# Patient Record
Sex: FEMALE | Race: WHITE | Employment: FULL TIME | URBAN - NONMETROPOLITAN AREA
[De-identification: names, ages, dates, MRNs, and addresses within clinical notes are randomized per-mention and may not be internally consistent; named-entity substitution may affect disease eponyms.]

---

## 2017-08-10 ENCOUNTER — NURSE TRIAGE (OUTPATIENT)
Dept: ADMINISTRATIVE | Age: 22
End: 2017-08-10

## 2019-03-30 ENCOUNTER — HOSPITAL ENCOUNTER (EMERGENCY)
Age: 24
Discharge: HOME | End: 2019-03-30
Payer: COMMERCIAL

## 2019-03-30 VITALS
BODY MASS INDEX: 29.53 KG/M2 | WEIGHT: 218 LBS | RESPIRATION RATE: 16 BRPM | HEART RATE: 75 BPM | SYSTOLIC BLOOD PRESSURE: 129 MMHG | OXYGEN SATURATION: 98 % | DIASTOLIC BLOOD PRESSURE: 79 MMHG | TEMPERATURE: 97.5 F | HEIGHT: 72 IN

## 2019-03-30 DIAGNOSIS — J00 COMMON COLD: Primary | ICD-10-CM

## 2019-03-30 LAB
FLU A ANTIGEN: NEGATIVE
FLU B ANTIGEN: NEGATIVE
GROUP A STREP CULTURE, REFLEX: NEGATIVE
REFLEX THROAT C + S: NORMAL

## 2019-03-30 PROCEDURE — 87880 STREP A ASSAY W/OPTIC: CPT

## 2019-03-30 PROCEDURE — 99213 OFFICE O/P EST LOW 20 MIN: CPT

## 2019-03-30 PROCEDURE — 87070 CULTURE OTHR SPECIMN AEROBIC: CPT

## 2019-03-30 PROCEDURE — 87804 INFLUENZA ASSAY W/OPTIC: CPT

## 2019-03-30 PROCEDURE — 99202 OFFICE O/P NEW SF 15 MIN: CPT | Performed by: NURSE PRACTITIONER

## 2019-03-30 RX ORDER — BROMPHENIRAMINE MALEATE, PSEUDOEPHEDRINE HYDROCHLORIDE, AND DEXTROMETHORPHAN HYDROBROMIDE 2; 30; 10 MG/5ML; MG/5ML; MG/5ML
5 SYRUP ORAL 4 TIMES DAILY PRN
Qty: 118 ML | Refills: 0 | Status: SHIPPED | OUTPATIENT
Start: 2019-03-30 | End: 2019-06-29

## 2019-03-30 ASSESSMENT — PAIN SCALES - GENERAL: PAINLEVEL_OUTOF10: 4

## 2019-03-30 ASSESSMENT — ENCOUNTER SYMPTOMS
DIARRHEA: 0
CHEST TIGHTNESS: 0
ABDOMINAL PAIN: 0
NAUSEA: 0
VOMITING: 0
SINUS PRESSURE: 0
TROUBLE SWALLOWING: 0
SORE THROAT: 0
COUGH: 1
SHORTNESS OF BREATH: 0
RHINORRHEA: 0

## 2019-03-30 ASSESSMENT — PAIN DESCRIPTION - LOCATION: LOCATION: HEAD

## 2019-03-30 NOTE — ED NOTES
To [facility] with complaints of cough, sore throat, body aches, back pain. Symptoms started Wednesday night/thursday morning.       Flavio Garza RN  03/30/19 8263

## 2019-03-30 NOTE — ED PROVIDER NOTES
[phone]       Chief Complaint   Patient presents with    Cough    Pharyngitis    Back Pain    Generalized Body Aches       Nurses Notes reviewed and I agree except as noted in the HPI. HISTORY OF PRESENT ILLNESS   Sin Fuller is a 26 y.o. female who presents for evaluation of flu like symptoms. REVIEW OF SYSTEMS     Review of Systems   Constitutional: Positive for fatigue. Negative for chills and fever. HENT: Positive for congestion. Negative for ear pain, postnasal drip, rhinorrhea, sinus pressure, sore throat and trouble swallowing. Respiratory: Positive for cough. Negative for chest tightness and shortness of breath. Cardiovascular: Negative for chest pain. Gastrointestinal: Negative for abdominal pain, diarrhea, nausea and vomiting. Musculoskeletal: Positive for myalgias. Skin: Negative for rash. Allergic/Immunologic: Negative for environmental allergies and food allergies. Neurological: Negative for headaches. PAST MEDICAL HISTORY   History reviewed. No pertinent past medical history. SURGICAL HISTORY     Patient  has no past surgical history on file. CURRENT MEDICATIONS       Previous Medications    No medications on file       ALLERGIES     Patient is has No Known Allergies. FAMILY HISTORY     Patient'sfamily history is not on file. SOCIAL HISTORY     Patient  reports that she has been smoking. She has been smoking about 0.50 packs per day. She has never used smokeless tobacco. She reports that she drank alcohol. She reports that she does not use drugs. PHYSICAL EXAM     ED TRIAGE VITALS  BP: 129/79, Temp: 97.5 °F (36.4 °C), Pulse: 75, Resp: 16, SpO2: 98 %  Physical Exam   Constitutional: She is oriented to person, place, and time. Vital signs are normal. She appears well-developed and well-nourished. She is cooperative. No distress.    HENT:   Head: Normocephalic and atraumatic. Right Ear: Hearing, tympanic membrane, external ear and ear canal normal.   Left Ear: Hearing, tympanic membrane, external ear and ear canal normal.   Nose: Mucosal edema present. No rhinorrhea. Right sinus exhibits no maxillary sinus tenderness and no frontal sinus tenderness. Left sinus exhibits no maxillary sinus tenderness and no frontal sinus tenderness. Mouth/Throat: Uvula is midline, oropharynx is clear and moist and mucous membranes are normal. No uvula swelling. No oropharyngeal exudate, posterior oropharyngeal edema, posterior oropharyngeal erythema or tonsillar abscesses. Eyes: Conjunctivae are normal.   Neck: Normal range of motion and full passive range of motion without pain. No neck rigidity. Cardiovascular: Normal rate and normal heart sounds. Pulmonary/Chest: Effort normal and breath sounds normal. No accessory muscle usage. No respiratory distress. Lymphadenopathy:     She has no cervical adenopathy. Neurological: She is alert and oriented to person, place, and time. Skin: Skin is warm and dry. No rash (on exposed surfaces) noted. Psychiatric: She has a normal mood and affect. Her speech is normal.   Nursing note and vitals reviewed. DIAGNOSTIC RESULTS   Labs:   Results for orders placed or performed during the hospital encounter of 03/30/19   Strep A culture, throat   Result Value Ref Range    REFLEX THROAT C + S INDICATED    Rapid influenza A/B antigens   Result Value Ref Range    Flu A Antigen NEGATIVE NEGATIVE    Flu B Antigen NEGATIVE NEGATIVE   STREP A ANTIGEN   Result Value Ref Range    GROUP A STREP CULTURE, REFLEX NEGATIVE        IMAGING:  No orders to display     URGENT CARE COURSE:     Vitals:    03/30/19 1110   BP: 129/79   Pulse: 75   Resp: 16   Temp: 97.5 °F (36.4 °C)   TempSrc: Temporal   SpO2: 98%   Weight: 218 lb (98.9 kg)   Height: 6' 1\" (1.854 m)       Medications - No data to display  PROCEDURES:  None  FINALIMPRESSION      1.  Common cold DISPOSITION/PLAN   DISPOSITION    Discharge  Strep negative culture pending  Influenza A and B negative   Denies exposure to influenza. Referral to transitional care clinic for further evaluation and monitoring. Physical exam is s unremarkable for acute process patient is afebrile. Symptoms are likely viral in nature at this time. Physical assessment findings, diagnostic testing(s) if applicable, and vital signs reviewed with patient/patient representative. Questions answered. Medications as directed, including OTC medications for supportive care. Education provided on medications. Differential diagnosis(s) discussed with patient/patient representative. Home care/self care instructions reviewed with patient/patient representative. Patient is to follow-up with family care provider in 2-3 days if no improvement. Patient is to go to the emergency department if symptoms worsen. Patient/patient representative is aware of care plan, questions answered, verbalizes understanding and is in agreement. Teach back method used for patient/patient representative teaching(s) and printed instructions attached to after visit summary.     PATIENT REFERRED TO:  [facility]  [address] [place] [facility]  [phone]    As needed, If symptoms worsen, [address] [place] [address] Urgent Care  [address] [facility]  [phone]    As needed, If symptoms worsen, GO DIRECTLY TO THE EMERGENCY DEPARTMENT    DISCHARGE MEDICATIONS:  New Prescriptions    BROMPHENIRAMINE-PSEUDOEPHEDRINE-DM 2-30-10 MG/5ML SYRUP    Take 5 mLs by mouth 4 times daily as needed for Congestion or Cough     Current Discharge Medication List          Alvino Hannon, [address], APRN - CNP  03/30/19 7870

## 2019-04-01 LAB — THROAT/NOSE CULTURE: NORMAL

## 2019-06-29 ENCOUNTER — HOSPITAL ENCOUNTER (EMERGENCY)
Age: 24
Discharge: HOME | End: 2019-06-29
Payer: MEDICAID

## 2019-06-29 ENCOUNTER — APPOINTMENT (OUTPATIENT)
Dept: ULTRASOUND IMAGING | Age: 24
End: 2019-06-29
Payer: MEDICAID

## 2019-06-29 VITALS
RESPIRATION RATE: 18 BRPM | DIASTOLIC BLOOD PRESSURE: 62 MMHG | SYSTOLIC BLOOD PRESSURE: 118 MMHG | HEIGHT: 72 IN | TEMPERATURE: 98.9 F | HEART RATE: 62 BPM | WEIGHT: 215 LBS | BODY MASS INDEX: 29.12 KG/M2 | OXYGEN SATURATION: 99 %

## 2019-06-29 DIAGNOSIS — O46.90 VAGINAL BLEEDING IN PREGNANCY: Primary | ICD-10-CM

## 2019-06-29 LAB
ABO: NORMAL
ALBUMIN SERPL-MCNC: 4.1 G/DL (ref 3.5–5.1)
ALP BLD-CCNC: 31 U/L (ref 38–126)
ALT SERPL-CCNC: 11 U/L (ref 11–66)
AMPHETAMINE+METHAMPHETAMINE URINE SCREEN: NEGATIVE
ANION GAP SERPL CALCULATED.3IONS-SCNC: 11 MEQ/L (ref 8–16)
AST SERPL-CCNC: 15 U/L (ref 5–40)
BARBITURATE QUANTITATIVE URINE: NEGATIVE
BASOPHILS # BLD: 0.3 %
BASOPHILS ABSOLUTE: 0 THOU/MM3 (ref 0–0.1)
BENZODIAZEPINE QUANTITATIVE URINE: NEGATIVE
BILIRUB SERPL-MCNC: 0.3 MG/DL (ref 0.3–1.2)
BILIRUBIN DIRECT: < 0.2 MG/DL (ref 0–0.3)
BILIRUBIN URINE: NEGATIVE
BLOOD, URINE: NEGATIVE
BUN BLDV-MCNC: 9 MG/DL (ref 7–22)
CALCIUM SERPL-MCNC: 8.9 MG/DL (ref 8.5–10.5)
CANNABINOID QUANTITATIVE URINE: POSITIVE
CHARACTER, URINE: CLEAR
CHLAMYDIA TRACHOMATIS BY RT-PCR: NOT DETECTED
CHLORIDE BLD-SCNC: 103 MEQ/L (ref 98–111)
CO2: 23 MEQ/L (ref 23–33)
COCAINE METABOLITE QUANTITATIVE URINE: NEGATIVE
COLOR: YELLOW
CREAT SERPL-MCNC: 0.4 MG/DL (ref 0.4–1.2)
CT/NG SOURCE: NORMAL
EOSINOPHIL # BLD: 1.2 %
EOSINOPHILS ABSOLUTE: 0.1 THOU/MM3 (ref 0–0.4)
ERYTHROCYTE [DISTWIDTH] IN BLOOD BY AUTOMATED COUNT: 12.7 % (ref 11.5–14.5)
ERYTHROCYTE [DISTWIDTH] IN BLOOD BY AUTOMATED COUNT: 41.7 FL (ref 35–45)
GFR SERPL CREATININE-BSD FRML MDRD: > 90 ML/MIN/1.73M2
GLUCOSE BLD-MCNC: 83 MG/DL (ref 70–108)
GLUCOSE URINE: NEGATIVE MG/DL
HCG,BETA SUBUNIT,QUAL,SERUM: ABNORMAL MIU/ML (ref 0–5)
HCT VFR BLD CALC: 35.1 % (ref 37–47)
HEMOGLOBIN: 11.9 GM/DL (ref 12–16)
IMMATURE GRANS (ABS): 0.03 THOU/MM3 (ref 0–0.07)
IMMATURE GRANULOCYTES: 0.4 %
KETONES, URINE: 15
KOH PREP: NORMAL
LEUKOCYTE ESTERASE, URINE: NEGATIVE
LIPASE: 31.7 U/L (ref 5.6–51.3)
LYMPHOCYTES # BLD: 23.6 %
LYMPHOCYTES ABSOLUTE: 1.8 THOU/MM3 (ref 1–4.8)
MAGNESIUM: 1.9 MG/DL (ref 1.6–2.4)
MCH RBC QN AUTO: 31 PG (ref 26–33)
MCHC RBC AUTO-ENTMCNC: 33.9 GM/DL (ref 32.2–35.5)
MCV RBC AUTO: 91.4 FL (ref 81–99)
MONOCYTES # BLD: 8.5 %
MONOCYTES ABSOLUTE: 0.7 THOU/MM3 (ref 0.4–1.3)
NEISSERIA GONORRHOEAE BY RT-PCR: NOT DETECTED
NITRITE, URINE: NEGATIVE
NUCLEATED RED BLOOD CELLS: 0 /100 WBC
OPIATES, URINE: NEGATIVE
OSMOLALITY CALCULATION: 271.6 MOSMOL/KG (ref 275–300)
OXYCODONE: NEGATIVE
PH UA: 6.5 (ref 5–9)
PHENCYCLIDINE QUANTITATIVE URINE: NEGATIVE
PLATELET # BLD: 144 THOU/MM3 (ref 130–400)
PMV BLD AUTO: 11.8 FL (ref 9.4–12.4)
POTASSIUM SERPL-SCNC: 3.7 MEQ/L (ref 3.5–5.2)
PREGNANCY, SERUM: POSITIVE
PROTEIN UA: NEGATIVE
RBC # BLD: 3.84 MILL/MM3 (ref 4.2–5.4)
REASON FOR REJECTION: NORMAL
REJECTED TEST: NORMAL
RH FACTOR: NORMAL
SEG NEUTROPHILS: 66 %
SEGMENTED NEUTROPHILS ABSOLUTE COUNT: 5.1 THOU/MM3 (ref 1.8–7.7)
SODIUM BLD-SCNC: 137 MEQ/L (ref 135–145)
SPECIFIC GRAVITY, URINE: 1.01 (ref 1–1.03)
TOTAL PROTEIN: 6.6 G/DL (ref 6.1–8)
TRICHOMONAS PREP: NORMAL
UROBILINOGEN, URINE: 0.2 EU/DL (ref 0–1)
WBC # BLD: 7.8 THOU/MM3 (ref 4.8–10.8)

## 2019-06-29 PROCEDURE — 86900 BLOOD TYPING SEROLOGIC ABO: CPT

## 2019-06-29 PROCEDURE — 36415 COLL VENOUS BLD VENIPUNCTURE: CPT

## 2019-06-29 PROCEDURE — 82248 BILIRUBIN DIRECT: CPT

## 2019-06-29 PROCEDURE — 84703 CHORIONIC GONADOTROPIN ASSAY: CPT

## 2019-06-29 PROCEDURE — 87220 TISSUE EXAM FOR FUNGI: CPT

## 2019-06-29 PROCEDURE — 80307 DRUG TEST PRSMV CHEM ANLYZR: CPT

## 2019-06-29 PROCEDURE — 80053 COMPREHEN METABOLIC PANEL: CPT

## 2019-06-29 PROCEDURE — 86901 BLOOD TYPING SEROLOGIC RH(D): CPT

## 2019-06-29 PROCEDURE — 99284 EMERGENCY DEPT VISIT MOD MDM: CPT

## 2019-06-29 PROCEDURE — 83735 ASSAY OF MAGNESIUM: CPT

## 2019-06-29 PROCEDURE — 83690 ASSAY OF LIPASE: CPT

## 2019-06-29 PROCEDURE — 87210 SMEAR WET MOUNT SALINE/INK: CPT

## 2019-06-29 PROCEDURE — 85025 COMPLETE CBC W/AUTO DIFF WBC: CPT

## 2019-06-29 PROCEDURE — 81003 URINALYSIS AUTO W/O SCOPE: CPT

## 2019-06-29 PROCEDURE — 87591 N.GONORRHOEAE DNA AMP PROB: CPT

## 2019-06-29 PROCEDURE — 87070 CULTURE OTHR SPECIMN AEROBIC: CPT

## 2019-06-29 PROCEDURE — 87205 SMEAR GRAM STAIN: CPT

## 2019-06-29 PROCEDURE — 76817 TRANSVAGINAL US OBSTETRIC: CPT

## 2019-06-29 PROCEDURE — 2580000003 HC RX 258: Performed by: NURSE PRACTITIONER

## 2019-06-29 PROCEDURE — 87491 CHLMYD TRACH DNA AMP PROBE: CPT

## 2019-06-29 PROCEDURE — 84702 CHORIONIC GONADOTROPIN TEST: CPT

## 2019-06-29 RX ORDER — 0.9 % SODIUM CHLORIDE 0.9 %
1000 INTRAVENOUS SOLUTION INTRAVENOUS ONCE
Status: COMPLETED | OUTPATIENT
Start: 2019-06-29 | End: 2019-06-29

## 2019-06-29 RX ORDER — METOCLOPRAMIDE 10 MG/1
10 TABLET ORAL 4 TIMES DAILY
COMMUNITY

## 2019-06-29 RX ADMIN — SODIUM CHLORIDE 1000 ML: 9 INJECTION, SOLUTION INTRAVENOUS at 01:58

## 2019-06-29 ASSESSMENT — PAIN DESCRIPTION - ORIENTATION: ORIENTATION: LOWER

## 2019-06-29 ASSESSMENT — ENCOUNTER SYMPTOMS
SORE THROAT: 0
ABDOMINAL PAIN: 1
RHINORRHEA: 0
WHEEZING: 0
DIARRHEA: 0
VOMITING: 1
NAUSEA: 1
SHORTNESS OF BREATH: 0
BACK PAIN: 0
COUGH: 0
EYE DISCHARGE: 0
EYE PAIN: 0

## 2019-06-29 ASSESSMENT — PAIN SCALES - GENERAL: PAINLEVEL_OUTOF10: 4

## 2019-06-29 ASSESSMENT — PAIN DESCRIPTION - DESCRIPTORS: DESCRIPTORS: CRAMPING

## 2019-06-29 ASSESSMENT — PAIN DESCRIPTION - LOCATION: LOCATION: ABDOMEN

## 2019-06-29 NOTE — ED NOTES
Patient urinated in ultrasound told the tech she needed to give a sample and was told she didn't have a cup. Will wait for patient to try to urinate to get sample provider updated on delay of care.       Prince Jensen RN  06/29/19 9444

## 2019-06-29 NOTE — ED PROVIDER NOTES
work was reassuring. Within the department, the patient was treated with IV fluids. I observed the patient's condition to remain stable during the duration of the stay. I explained my proposed course of treatment to the patient, who were amenable to my treatment and discharge decisions. They were discharged home in stable condition, and the patient will return to the ED if the symptoms become more severe in nature or otherwise change. Patient is advised to follow with an OBGYN. CRITICAL CARE:   None      CONSULTS:  Dr Fabiola Urbina, agrees with my plan to discharge with ob followup    PROCEDURES:  None     FINAL IMPRESSION     1. Vaginal bleeding in pregnancy          DISPOSITION/PLAN   discharge    PATIENT REFERRED TO:  Tierney Mims MD  [place] [address]  [phone]    In 2 days        DISCHARGE MEDICATIONS:  New Prescriptions    No medications on file       (Please note that portions of this note were completed with a voice recognition program.Efforts were made to edit the dictations but occasionally words are mis-transcribed.)    The patient was given an opportunity to see the EmergencyAttending. The patient voiced understanding that I was a Mid-Level Provider and was in agreement with being seenindependently by myself. Scribe: Francine Russ 6/29/19 1:41 AM Scribing for and in the [address] [place], [place] [address]TONIA clark. .    Signed by:Jessica Mckeon, 06/29/19 5:23 AM    Provider:  I personally performed the services described in the documentation, reviewed and edited thedocumentation which was dictated to the scribe in my presence, and it accurately records my words andactions.     Christophe Jo CNP. 6/29/19 5:23 AM        [place] MEHREEN Johnston CNP  07/01/19 0966

## 2019-07-02 LAB
GENITAL CULTURE, ROUTINE: NORMAL
GRAM STAIN RESULT: NORMAL

## 2019-07-08 ENCOUNTER — HOSPITAL ENCOUNTER (OUTPATIENT)
Dept: NURSING | Age: 24
Discharge: HOME | End: 2019-07-08
Payer: COMMERCIAL

## 2019-07-08 VITALS
OXYGEN SATURATION: 100 % | TEMPERATURE: 97.9 F | SYSTOLIC BLOOD PRESSURE: 114 MMHG | RESPIRATION RATE: 18 BRPM | HEART RATE: 66 BPM | DIASTOLIC BLOOD PRESSURE: 65 MMHG

## 2019-07-08 LAB
ABO: NORMAL
ANION GAP SERPL CALCULATED.3IONS-SCNC: 14 MEQ/L (ref 8–16)
ANTIBODY SCREEN: NORMAL
BUN BLDV-MCNC: 7 MG/DL (ref 7–22)
CALCIUM SERPL-MCNC: 9.8 MG/DL (ref 8.5–10.5)
CHLORIDE BLD-SCNC: 100 MEQ/L (ref 98–111)
CO2: 22 MEQ/L (ref 23–33)
CREAT SERPL-MCNC: 0.5 MG/DL (ref 0.4–1.2)
ERYTHROCYTE [DISTWIDTH] IN BLOOD BY AUTOMATED COUNT: 12.4 % (ref 11.5–14.5)
ERYTHROCYTE [DISTWIDTH] IN BLOOD BY AUTOMATED COUNT: 40.6 FL (ref 35–45)
GFR SERPL CREATININE-BSD FRML MDRD: > 90 ML/MIN/1.73M2
GLUCOSE BLD-MCNC: 88 MG/DL (ref 70–108)
HCT VFR BLD CALC: 42.2 % (ref 37–47)
HEMOGLOBIN: 14.7 GM/DL (ref 12–16)
HEPATITIS B SURFACE ANTIGEN: NEGATIVE
MCH RBC QN AUTO: 31.4 PG (ref 26–33)
MCHC RBC AUTO-ENTMCNC: 34.8 GM/DL (ref 32.2–35.5)
MCV RBC AUTO: 90.2 FL (ref 81–99)
PLATELET # BLD: 154 THOU/MM3 (ref 130–400)
PMV BLD AUTO: 11.7 FL (ref 9.4–12.4)
POTASSIUM SERPL-SCNC: 3.6 MEQ/L (ref 3.5–5.2)
RBC # BLD: 4.68 MILL/MM3 (ref 4.2–5.4)
RH FACTOR: NORMAL
RPR: NONREACTIVE
RUBELLA: 293.7 IU/ML
SODIUM BLD-SCNC: 136 MEQ/L (ref 135–145)
WBC # BLD: 8.5 THOU/MM3 (ref 4.8–10.8)

## 2019-07-08 PROCEDURE — 96374 THER/PROPH/DIAG INJ IV PUSH: CPT

## 2019-07-08 PROCEDURE — 6360000002 HC RX W HCPCS: Performed by: OBSTETRICS & GYNECOLOGY

## 2019-07-08 PROCEDURE — 87389 HIV-1 AG W/HIV-1&-2 AB AG IA: CPT

## 2019-07-08 PROCEDURE — 80048 BASIC METABOLIC PNL TOTAL CA: CPT

## 2019-07-08 PROCEDURE — 36415 COLL VENOUS BLD VENIPUNCTURE: CPT

## 2019-07-08 PROCEDURE — 86901 BLOOD TYPING SEROLOGIC RH(D): CPT

## 2019-07-08 PROCEDURE — 99211 OFF/OP EST MAY X REQ PHY/QHP: CPT

## 2019-07-08 PROCEDURE — 96375 TX/PRO/DX INJ NEW DRUG ADDON: CPT

## 2019-07-08 PROCEDURE — 96360 HYDRATION IV INFUSION INIT: CPT

## 2019-07-08 PROCEDURE — 86592 SYPHILIS TEST NON-TREP QUAL: CPT

## 2019-07-08 PROCEDURE — 96361 HYDRATE IV INFUSION ADD-ON: CPT

## 2019-07-08 PROCEDURE — 87340 HEPATITIS B SURFACE AG IA: CPT

## 2019-07-08 PROCEDURE — 2580000003 HC RX 258: Performed by: OBSTETRICS & GYNECOLOGY

## 2019-07-08 PROCEDURE — 2709999900 HC NON-CHARGEABLE SUPPLY

## 2019-07-08 PROCEDURE — 86762 RUBELLA ANTIBODY: CPT

## 2019-07-08 PROCEDURE — 86900 BLOOD TYPING SEROLOGIC ABO: CPT

## 2019-07-08 PROCEDURE — 85027 COMPLETE CBC AUTOMATED: CPT

## 2019-07-08 PROCEDURE — 86850 RBC ANTIBODY SCREEN: CPT

## 2019-07-08 RX ORDER — LANOLIN ALCOHOL/MO/W.PET/CERES
50 CREAM (GRAM) TOPICAL 2 TIMES DAILY
COMMUNITY

## 2019-07-08 RX ORDER — ONDANSETRON 2 MG/ML
8 INJECTION INTRAMUSCULAR; INTRAVENOUS
Status: COMPLETED | OUTPATIENT
Start: 2019-07-08 | End: 2019-07-08

## 2019-07-08 RX ORDER — FOLIC ACID 1 MG/1
1 TABLET ORAL DAILY
COMMUNITY

## 2019-07-08 RX ORDER — DEXTROSE, SODIUM CHLORIDE, SODIUM LACTATE, POTASSIUM CHLORIDE, AND CALCIUM CHLORIDE 5; .6; .31; .03; .02 G/100ML; G/100ML; G/100ML; G/100ML; G/100ML
INJECTION, SOLUTION INTRAVENOUS CONTINUOUS
Status: ACTIVE | OUTPATIENT
Start: 2019-07-08 | End: 2019-07-08

## 2019-07-08 RX ORDER — SODIUM CHLORIDE, SODIUM LACTATE, POTASSIUM CHLORIDE, CALCIUM CHLORIDE 600; 310; 30; 20 MG/100ML; MG/100ML; MG/100ML; MG/100ML
INJECTION, SOLUTION INTRAVENOUS CONTINUOUS
Status: ACTIVE | OUTPATIENT
Start: 2019-07-08 | End: 2019-07-08

## 2019-07-08 RX ORDER — PROMETHAZINE HYDROCHLORIDE 25 MG/ML
12.5 INJECTION, SOLUTION INTRAMUSCULAR; INTRAVENOUS
Status: ACTIVE | OUTPATIENT
Start: 2019-07-08 | End: 2019-07-08

## 2019-07-08 RX ADMIN — ONDANSETRON 8 MG: 2 INJECTION INTRAMUSCULAR; INTRAVENOUS at 11:48

## 2019-07-08 RX ADMIN — SODIUM CHLORIDE, POTASSIUM CHLORIDE, SODIUM LACTATE AND CALCIUM CHLORIDE: 600; 310; 30; 20 INJECTION, SOLUTION INTRAVENOUS at 12:06

## 2019-07-08 RX ADMIN — SODIUM CHLORIDE, SODIUM LACTATE, POTASSIUM CHLORIDE, CALCIUM CHLORIDE AND DEXTROSE MONOHYDRATE: 5; 600; 310; 30; 20 INJECTION, SOLUTION INTRAVENOUS at 11:00

## 2019-07-08 ASSESSMENT — PAIN DESCRIPTION - DESCRIPTORS: DESCRIPTORS: ACHING;SHARP

## 2019-07-08 ASSESSMENT — PAIN - FUNCTIONAL ASSESSMENT: PAIN_FUNCTIONAL_ASSESSMENT: 0-10

## 2019-07-08 NOTE — PROGRESS NOTES
[address] FLUIDS PROCEDURE REVIEWED WITH PT VERBALIZED UNDERSTANDING. Pt rights and responsibilities offered to pt to read.     __MET__ Safety:       (Environmental)   [place] to environment   Ensure ID band is correct and in place/ allergy band as needed   Assess for fall risk   Initiate fall precautions as applicable (fall band, side rails, etc.)   Call light within reach   Bed in low position/ wheels locked    __MET__ Pain:        Assess pain level and characteristics   Administer analgesics as ordered   Assess effectiveness of pain management and report to MD as needed    __MET__ Knowledge Deficit:   Assess baseline knowledge   Provide teaching at level of understanding   Provide teaching via preferred learning method   Evaluate teaching effectiveness

## 2019-07-08 NOTE — PROGRESS NOTES
Dr Lan Sesay office called referring to patients lab work, office staff stated it's okay to discharge after her infusions.

## 2019-07-09 LAB
HIV-2 AB: NEGATIVE
REASON FOR REJECTION: NORMAL
REJECTED TEST: NORMAL

## 2019-07-10 ENCOUNTER — NURSE ONLY (OUTPATIENT)
Dept: LAB | Age: 24
End: 2019-07-10

## 2019-07-10 LAB
ABO: NORMAL
ANTIBODY SCREEN: NORMAL
ERYTHROCYTE [DISTWIDTH] IN BLOOD BY AUTOMATED COUNT: NORMAL % (ref 11.5–14.5)
ERYTHROCYTE [DISTWIDTH] IN BLOOD BY AUTOMATED COUNT: NORMAL FL (ref 35–45)
HCT VFR BLD CALC: NORMAL % (ref 37–47)
HEMOGLOBIN: NORMAL GM/DL (ref 12–16)
HEPATITIS B SURFACE ANTIGEN: NORMAL
MCH RBC QN AUTO: NORMAL PG (ref 26–33)
MCHC RBC AUTO-ENTMCNC: NORMAL GM/DL (ref 32.2–35.5)
MCV RBC AUTO: NORMAL FL (ref 81–99)
PLATELET # BLD: NORMAL THOU/MM3 (ref 130–400)
PMV BLD AUTO: NORMAL FL (ref 9.4–12.4)
RBC # BLD: NORMAL MILL/MM3 (ref 4.2–5.4)
RH FACTOR: NORMAL
RPR: NORMAL
RUBELLA: NORMAL IU/ML
WBC # BLD: NORMAL THOU/MM3 (ref 4.8–10.8)

## 2019-07-11 LAB
ORGANISM: ABNORMAL
URINE CULTURE, ROUTINE: ABNORMAL

## 2020-01-20 ENCOUNTER — HOSPITAL ENCOUNTER (INPATIENT)
Age: 25
LOS: 2 days | Discharge: HOME | DRG: 560 | End: 2020-01-22
Attending: OBSTETRICS & GYNECOLOGY | Admitting: OBSTETRICS & GYNECOLOGY
Payer: MEDICAID

## 2020-01-20 ENCOUNTER — ANESTHESIA EVENT (OUTPATIENT)
Dept: LABOR AND DELIVERY | Age: 25
DRG: 560 | End: 2020-01-20
Payer: MEDICAID

## 2020-01-20 ENCOUNTER — ANESTHESIA (OUTPATIENT)
Dept: LABOR AND DELIVERY | Age: 25
DRG: 560 | End: 2020-01-20
Payer: MEDICAID

## 2020-01-20 ENCOUNTER — APPOINTMENT (OUTPATIENT)
Dept: LABOR AND DELIVERY | Age: 25
DRG: 560 | End: 2020-01-20
Payer: MEDICAID

## 2020-01-20 LAB
ABO: NORMAL
ALBUMIN SERPL-MCNC: 3 G/DL (ref 3.5–5.1)
ALP BLD-CCNC: 269 U/L (ref 38–126)
ALT SERPL-CCNC: 8 U/L (ref 11–66)
AMNISURE PATIENT RESULT: NORMAL
AMPHETAMINE+METHAMPHETAMINE URINE SCREEN: NEGATIVE
ANION GAP SERPL CALCULATED.3IONS-SCNC: 12 MEQ/L (ref 8–16)
ANTIBODY SCREEN: NORMAL
AST SERPL-CCNC: 14 U/L (ref 5–40)
BARBITURATE QUANTITATIVE URINE: NEGATIVE
BENZODIAZEPINE QUANTITATIVE URINE: NEGATIVE
BILIRUB SERPL-MCNC: 0.3 MG/DL (ref 0.3–1.2)
BUN BLDV-MCNC: 10 MG/DL (ref 7–22)
CALCIUM SERPL-MCNC: 9.1 MG/DL (ref 8.5–10.5)
CANNABINOID QUANTITATIVE URINE: NEGATIVE
CHLORIDE BLD-SCNC: 106 MEQ/L (ref 98–111)
CO2: 19 MEQ/L (ref 23–33)
COCAINE METABOLITE QUANTITATIVE URINE: NEGATIVE
CREAT SERPL-MCNC: 0.5 MG/DL (ref 0.4–1.2)
CREATININE URINE: 82.4 MG/DL
ERYTHROCYTE [DISTWIDTH] IN BLOOD BY AUTOMATED COUNT: 12.7 % (ref 11.5–14.5)
ERYTHROCYTE [DISTWIDTH] IN BLOOD BY AUTOMATED COUNT: 42 FL (ref 35–45)
GFR SERPL CREATININE-BSD FRML MDRD: > 90 ML/MIN/1.73M2
GLUCOSE BLD-MCNC: 80 MG/DL (ref 70–108)
HCT VFR BLD CALC: 38.6 % (ref 37–47)
HEMOGLOBIN: 13.1 GM/DL (ref 12–16)
MCH RBC QN AUTO: 31 PG (ref 26–33)
MCHC RBC AUTO-ENTMCNC: 33.9 GM/DL (ref 32.2–35.5)
MCV RBC AUTO: 91.5 FL (ref 81–99)
OPIATES, URINE: NEGATIVE
OXYCODONE: NEGATIVE
PHENCYCLIDINE QUANTITATIVE URINE: NEGATIVE
PLATELET # BLD: 172 THOU/MM3 (ref 130–400)
PMV BLD AUTO: 12.2 FL (ref 9.4–12.4)
POTASSIUM SERPL-SCNC: 3.9 MEQ/L (ref 3.5–5.2)
PROT/CREAT RATIO, UR: 0.16
PROTEIN, URINE: 13 MG/DL
RBC # BLD: 4.22 MILL/MM3 (ref 4.2–5.4)
RH FACTOR: NORMAL
RPR: NONREACTIVE
SODIUM BLD-SCNC: 137 MEQ/L (ref 135–145)
TOTAL PROTEIN: 5.9 G/DL (ref 6.1–8)
URIC ACID: 4.7 MG/DL (ref 2.4–5.7)
WBC # BLD: 13.2 THOU/MM3 (ref 4.8–10.8)

## 2020-01-20 PROCEDURE — 86592 SYPHILIS TEST NON-TREP QUAL: CPT

## 2020-01-20 PROCEDURE — 86901 BLOOD TYPING SEROLOGIC RH(D): CPT

## 2020-01-20 PROCEDURE — 80053 COMPREHEN METABOLIC PANEL: CPT

## 2020-01-20 PROCEDURE — 82570 ASSAY OF URINE CREATININE: CPT

## 2020-01-20 PROCEDURE — 85027 COMPLETE CBC AUTOMATED: CPT

## 2020-01-20 PROCEDURE — 84156 ASSAY OF PROTEIN URINE: CPT

## 2020-01-20 PROCEDURE — 3700000025 EPIDURAL BLOCK: Performed by: ANESTHESIOLOGY

## 2020-01-20 PROCEDURE — 2580000003 HC RX 258: Performed by: OBSTETRICS & GYNECOLOGY

## 2020-01-20 PROCEDURE — 2709999900 HC NON-CHARGEABLE SUPPLY

## 2020-01-20 PROCEDURE — 0KQM0ZZ REPAIR PERINEUM MUSCLE, OPEN APPROACH: ICD-10-PCS | Performed by: OBSTETRICS & GYNECOLOGY

## 2020-01-20 PROCEDURE — 6370000000 HC RX 637 (ALT 250 FOR IP): Performed by: OBSTETRICS & GYNECOLOGY

## 2020-01-20 PROCEDURE — 6360000002 HC RX W HCPCS

## 2020-01-20 PROCEDURE — 86850 RBC ANTIBODY SCREEN: CPT

## 2020-01-20 PROCEDURE — 7200000001 HC VAGINAL DELIVERY

## 2020-01-20 PROCEDURE — 36415 COLL VENOUS BLD VENIPUNCTURE: CPT

## 2020-01-20 PROCEDURE — 84550 ASSAY OF BLOOD/URIC ACID: CPT

## 2020-01-20 PROCEDURE — 3E033VJ INTRODUCTION OF OTHER HORMONE INTO PERIPHERAL VEIN, PERCUTANEOUS APPROACH: ICD-10-PCS | Performed by: OBSTETRICS & GYNECOLOGY

## 2020-01-20 PROCEDURE — 1220000000 HC SEMI PRIVATE OB R&B

## 2020-01-20 PROCEDURE — 6360000002 HC RX W HCPCS: Performed by: OBSTETRICS & GYNECOLOGY

## 2020-01-20 PROCEDURE — 84112 EVAL AMNIOTIC FLUID PROTEIN: CPT

## 2020-01-20 PROCEDURE — 2500000003 HC RX 250 WO HCPCS: Performed by: ANESTHESIOLOGY

## 2020-01-20 PROCEDURE — 86900 BLOOD TYPING SEROLOGIC ABO: CPT

## 2020-01-20 PROCEDURE — 6370000000 HC RX 637 (ALT 250 FOR IP)

## 2020-01-20 PROCEDURE — 80307 DRUG TEST PRSMV CHEM ANLYZR: CPT

## 2020-01-20 RX ORDER — IBUPROFEN 800 MG/1
800 TABLET ORAL EVERY 8 HOURS PRN
Status: DISCONTINUED | OUTPATIENT
Start: 2020-01-20 | End: 2020-01-20

## 2020-01-20 RX ORDER — TERBUTALINE SULFATE 1 MG/ML
0.25 INJECTION, SOLUTION SUBCUTANEOUS ONCE
Status: DISCONTINUED | OUTPATIENT
Start: 2020-01-20 | End: 2020-01-20

## 2020-01-20 RX ORDER — FERROUS SULFATE 325(65) MG
325 TABLET ORAL
Status: DISCONTINUED | OUTPATIENT
Start: 2020-01-20 | End: 2020-01-22 | Stop reason: HOSPADM

## 2020-01-20 RX ORDER — ACETAMINOPHEN 325 MG/1
650 TABLET ORAL EVERY 4 HOURS PRN
Status: DISCONTINUED | OUTPATIENT
Start: 2020-01-20 | End: 2020-01-22 | Stop reason: HOSPADM

## 2020-01-20 RX ORDER — SODIUM CHLORIDE, SODIUM LACTATE, POTASSIUM CHLORIDE, CALCIUM CHLORIDE 600; 310; 30; 20 MG/100ML; MG/100ML; MG/100ML; MG/100ML
INJECTION, SOLUTION INTRAVENOUS CONTINUOUS
Status: DISCONTINUED | OUTPATIENT
Start: 2020-01-20 | End: 2020-01-20

## 2020-01-20 RX ORDER — SODIUM CHLORIDE, SODIUM LACTATE, POTASSIUM CHLORIDE, CALCIUM CHLORIDE 600; 310; 30; 20 MG/100ML; MG/100ML; MG/100ML; MG/100ML
INJECTION, SOLUTION INTRAVENOUS CONTINUOUS
Status: DISCONTINUED | OUTPATIENT
Start: 2020-01-20 | End: 2020-01-22 | Stop reason: HOSPADM

## 2020-01-20 RX ORDER — NALOXONE HYDROCHLORIDE 0.4 MG/ML
0.4 INJECTION, SOLUTION INTRAMUSCULAR; INTRAVENOUS; SUBCUTANEOUS PRN
Status: DISCONTINUED | OUTPATIENT
Start: 2020-01-20 | End: 2020-01-20

## 2020-01-20 RX ORDER — IBUPROFEN 800 MG/1
TABLET ORAL
Status: COMPLETED
Start: 2020-01-20 | End: 2020-01-20

## 2020-01-20 RX ORDER — LIDOCAINE HYDROCHLORIDE 10 MG/ML
30 INJECTION, SOLUTION EPIDURAL; INFILTRATION; INTRACAUDAL; PERINEURAL PRN
Status: DISCONTINUED | OUTPATIENT
Start: 2020-01-20 | End: 2020-01-20

## 2020-01-20 RX ORDER — SODIUM CHLORIDE 0.9 % (FLUSH) 0.9 %
10 SYRINGE (ML) INJECTION PRN
Status: DISCONTINUED | OUTPATIENT
Start: 2020-01-20 | End: 2020-01-22 | Stop reason: HOSPADM

## 2020-01-20 RX ORDER — SODIUM CHLORIDE 0.9 % (FLUSH) 0.9 %
10 SYRINGE (ML) INJECTION EVERY 12 HOURS SCHEDULED
Status: DISCONTINUED | OUTPATIENT
Start: 2020-01-20 | End: 2020-01-22

## 2020-01-20 RX ORDER — DOCUSATE SODIUM 100 MG/1
100 CAPSULE, LIQUID FILLED ORAL 2 TIMES DAILY PRN
Status: DISCONTINUED | OUTPATIENT
Start: 2020-01-20 | End: 2020-01-22 | Stop reason: HOSPADM

## 2020-01-20 RX ORDER — METHYLERGONOVINE MALEATE 0.2 MG/ML
200 INJECTION INTRAVENOUS SEE ADMIN INSTRUCTIONS
Status: DISCONTINUED | OUTPATIENT
Start: 2020-01-20 | End: 2020-01-22 | Stop reason: HOSPADM

## 2020-01-20 RX ORDER — ACETAMINOPHEN 325 MG/1
650 TABLET ORAL EVERY 4 HOURS PRN
Status: DISCONTINUED | OUTPATIENT
Start: 2020-01-20 | End: 2020-01-20

## 2020-01-20 RX ORDER — HYDROCODONE BITARTRATE AND ACETAMINOPHEN 5; 325 MG/1; MG/1
1 TABLET ORAL EVERY 4 HOURS PRN
Status: DISCONTINUED | OUTPATIENT
Start: 2020-01-20 | End: 2020-01-22 | Stop reason: HOSPADM

## 2020-01-20 RX ORDER — ONDANSETRON 4 MG/1
8 TABLET, FILM COATED ORAL EVERY 8 HOURS PRN
Status: DISCONTINUED | OUTPATIENT
Start: 2020-01-20 | End: 2020-01-22 | Stop reason: HOSPADM

## 2020-01-20 RX ORDER — NALBUPHINE HCL 10 MG/ML
5 AMPUL (ML) INJECTION EVERY 4 HOURS PRN
Status: DISCONTINUED | OUTPATIENT
Start: 2020-01-20 | End: 2020-01-20

## 2020-01-20 RX ORDER — ONDANSETRON 2 MG/ML
8 INJECTION INTRAMUSCULAR; INTRAVENOUS EVERY 6 HOURS PRN
Status: DISCONTINUED | OUTPATIENT
Start: 2020-01-20 | End: 2020-01-20

## 2020-01-20 RX ORDER — DIPHENHYDRAMINE HYDROCHLORIDE 50 MG/ML
25 INJECTION INTRAMUSCULAR; INTRAVENOUS EVERY 4 HOURS PRN
Status: DISCONTINUED | OUTPATIENT
Start: 2020-01-20 | End: 2020-01-20

## 2020-01-20 RX ORDER — CARBOPROST TROMETHAMINE 250 UG/ML
250 INJECTION, SOLUTION INTRAMUSCULAR
Status: DISPENSED | OUTPATIENT
Start: 2020-01-20 | End: 2020-01-20

## 2020-01-20 RX ORDER — DOCUSATE SODIUM 100 MG/1
100 CAPSULE, LIQUID FILLED ORAL 2 TIMES DAILY
COMMUNITY

## 2020-01-20 RX ORDER — SODIUM CHLORIDE 0.9 % (FLUSH) 0.9 %
10 SYRINGE (ML) INJECTION PRN
Status: DISCONTINUED | OUTPATIENT
Start: 2020-01-20 | End: 2020-01-20

## 2020-01-20 RX ORDER — ONDANSETRON 2 MG/ML
4 INJECTION INTRAMUSCULAR; INTRAVENOUS EVERY 6 HOURS PRN
Status: DISCONTINUED | OUTPATIENT
Start: 2020-01-20 | End: 2020-01-20

## 2020-01-20 RX ORDER — MISOPROSTOL 200 UG/1
1000 TABLET ORAL PRN
Status: DISCONTINUED | OUTPATIENT
Start: 2020-01-20 | End: 2020-01-20

## 2020-01-20 RX ORDER — METHYLERGONOVINE MALEATE 0.2 MG/ML
200 INJECTION INTRAVENOUS PRN
Status: DISCONTINUED | OUTPATIENT
Start: 2020-01-20 | End: 2020-01-20

## 2020-01-20 RX ORDER — SEVOFLURANE 250 ML/250ML
1 LIQUID RESPIRATORY (INHALATION) CONTINUOUS PRN
Status: DISCONTINUED | OUTPATIENT
Start: 2020-01-20 | End: 2020-01-20

## 2020-01-20 RX ORDER — IBUPROFEN 800 MG/1
800 TABLET ORAL EVERY 8 HOURS SCHEDULED
Status: DISCONTINUED | OUTPATIENT
Start: 2020-01-20 | End: 2020-01-22 | Stop reason: HOSPADM

## 2020-01-20 RX ORDER — MISOPROSTOL 200 UG/1
1000 TABLET ORAL PRN
Status: DISCONTINUED | OUTPATIENT
Start: 2020-01-20 | End: 2020-01-22 | Stop reason: HOSPADM

## 2020-01-20 RX ORDER — SODIUM CHLORIDE 0.9 % (FLUSH) 0.9 %
10 SYRINGE (ML) INJECTION EVERY 12 HOURS SCHEDULED
Status: DISCONTINUED | OUTPATIENT
Start: 2020-01-20 | End: 2020-01-20

## 2020-01-20 RX ORDER — BUTORPHANOL TARTRATE 1 MG/ML
1 INJECTION, SOLUTION INTRAMUSCULAR; INTRAVENOUS
Status: DISCONTINUED | OUTPATIENT
Start: 2020-01-20 | End: 2020-01-20

## 2020-01-20 RX ORDER — LANOLIN 100 %
OINTMENT (GRAM) TOPICAL PRN
Status: DISCONTINUED | OUTPATIENT
Start: 2020-01-20 | End: 2020-01-22 | Stop reason: HOSPADM

## 2020-01-20 RX ORDER — CARBOPROST TROMETHAMINE 250 UG/ML
250 INJECTION, SOLUTION INTRAMUSCULAR PRN
Status: DISCONTINUED | OUTPATIENT
Start: 2020-01-20 | End: 2020-01-20

## 2020-01-20 RX ADMIN — IBUPROFEN 800 MG: 800 TABLET, FILM COATED ORAL at 07:45

## 2020-01-20 RX ADMIN — IBUPROFEN 800 MG: 800 TABLET, FILM COATED ORAL at 14:18

## 2020-01-20 RX ADMIN — DOCUSATE SODIUM 100 MG: 100 CAPSULE, LIQUID FILLED ORAL at 20:07

## 2020-01-20 RX ADMIN — SODIUM CHLORIDE, POTASSIUM CHLORIDE, SODIUM LACTATE AND CALCIUM CHLORIDE: 600; 310; 30; 20 INJECTION, SOLUTION INTRAVENOUS at 03:01

## 2020-01-20 RX ADMIN — BUTORPHANOL TARTRATE 1 MG: 1 INJECTION, SOLUTION INTRAMUSCULAR; INTRAVENOUS at 01:36

## 2020-01-20 RX ADMIN — Medication 16 ML/HR: at 04:06

## 2020-01-20 RX ADMIN — HYDROCODONE BITARTRATE AND ACETAMINOPHEN 1 TABLET: 5; 325 TABLET ORAL at 18:58

## 2020-01-20 RX ADMIN — Medication 1 MILLI-UNITS/MIN: at 04:30

## 2020-01-20 RX ADMIN — SODIUM CHLORIDE, POTASSIUM CHLORIDE, SODIUM LACTATE AND CALCIUM CHLORIDE: 600; 310; 30; 20 INJECTION, SOLUTION INTRAVENOUS at 01:00

## 2020-01-20 ASSESSMENT — PAIN SCALES - GENERAL
PAINLEVEL_OUTOF10: 9
PAINLEVEL_OUTOF10: 7
PAINLEVEL_OUTOF10: 3
PAINLEVEL_OUTOF10: 7

## 2020-01-20 ASSESSMENT — PAIN DESCRIPTION - DESCRIPTORS: DESCRIPTORS: SHARP

## 2020-01-20 ASSESSMENT — LIFESTYLE VARIABLES: SMOKING_STATUS: 1

## 2020-01-20 NOTE — FLOWSHEET NOTE
Explained patients right to have family, representative or physician notified of their admission. Patient has declined for physician to be notified. Patient has declined for family/representative to be notified.

## 2020-01-20 NOTE — H&P
[address] [place] [address]  History and Physical Update    Pt Name: Yaritza Phan  MRN: [id number]  YOB: 1995  Date of evaluation: 2020    [] I have examined the patient and reviewed the H&P/Consult and there are no changes to the patient or plans. [x] I have examined the patient and reviewed the H&P/Consult and have noted the following changes:     23yo  at 39/0 who presents in active labor. Discussion with the patient and/ or family for proposed care, treatment, services; benefits, risks, side effects; likelihood of achieving goals and potential problems that may occur during recuperation was had and all questions were answered. Discussion with the patient and/ or family of reasonable alternatives to the proposed care, treatment, services and the discussion of the risks, benefits, side effects related to the alternatives and the risk related to not receiving the proposed care treatment services was also had and all questions were answered.           Poncho Beckford  Electronically signed 2020 at 7:12 AM

## 2020-01-20 NOTE — PLAN OF CARE
Problem: Discharge Planning:  Goal: Discharged to appropriate level of care  Description  Discharged to appropriate level of care  Outcome: Ongoing   Verbalizes understanding of discharge in 2 days  Problem: Constipation:  Goal: Bowel elimination is within specified parameters  Description  Bowel elimination is within specified parameters  Outcome: Ongoing   stable  Problem: Fluid Volume - Imbalance:  Goal: Absence of imbalanced fluid volume signs and symptoms  Description  Absence of imbalanced fluid volume signs and symptoms  Outcome: Ongoing   stable  Problem: Infection - Risk of, Puerperal Infection:  Goal: Will show no infection signs and symptoms  Description  Will show no infection signs and symptoms  Outcome: Ongoing   afebrile  Problem: Mood - Altered:  Goal: Mood stable  Description  Mood stable  Outcome: Ongoing   calm  Problem: Pain - Acute:  Goal: Pain level will decrease  Description  Pain level will decrease  Outcome: Ongoing   Pain is a 5, ibuprofen given  Care plan reviewed with patient and SO. Patient and SO verbalize understanding of the plan of care and contribute to goal setting.
falls  Description  Will remain free from falls  Outcome: Ongoing  Note:   Side rails up x2. Call light within reach     Problem: Discharge Planning:  Goal: Discharged to appropriate level of care  Description  Discharged to appropriate level of care  Outcome: Ongoing  Note:   Plan for pt to remain on l&d for 2 hours following delivery then transferred to mom baby if stable. Care plan reviewed with patient and fob. Patient and fob verbalize understanding of the plan of care and contribute to goal setting.

## 2020-01-20 NOTE — L&D DELIVERY NOTE
Department of Obstetrics and Gynecology  Spontaneous Vaginal Delivery Note      Pt Name: Lynnette Singh  MRN: [id number] [place] #: [de-identified]  YOB: 1995  Procedure Performed By: María Art MD        Pre-operative Diagnosis:  Term pregnancy, Spontaneous labor, Single fetus and Uncomplicated pregnancy Gestational HTN    Post-operative Diagnosis: Same, delivered. Procedure: spontaneous vaginal delivery    Surgeon:  Wade Reyes    Information for the patient's :  João Galanr, Baby Girl Mary Ellen Silveira [[id number]]          Anesthesia:  epidural anesthesia    Estimated blood loss:  131ED    Complications:  none    Condition:  infant stable to general nursery and mother stable    Delivery Summary:  The patient is a 27 y.o.   OB History        1    Para        Term                AB        Living           SAB        TAB        Ectopic        Molar        Multiple        Live Births                 at 39w0d who was admitted for active phase labor. She received the following interventions: IV Pitocin augmentation. The patient progressed well,did receive an epidural, became complete and started to push. She was placed in the dorsal lithotomy position and prepped. She delivered the baby which was then placed on the mother's abdomen. Cord was clamped and cut and infant handed off to the waiting nurse for evaluation. The placenta delivered intact, whole and that the umbilical cord had three vessels noted. The perineum and vagina were explored and a 2nd degree laceration was repaired with Vicryl 3.0. Vaginal sweep was performed at the conclusion of delivery and all needles were taken off the field. Sponge counts correct      PMH:  History reviewed. No pertinent past medical history.       María Art

## 2020-01-20 NOTE — FLOWSHEET NOTE
Pt taken to 5A13 via bed with infant in arms, both in stable condition. Report given to Mikeal Cowden, RN.

## 2020-01-20 NOTE — FLOWSHEET NOTE
Dr. Darrel Dale updated on  39&0 pt of Dr. Motta Laser here for contractions every 3-4 minutes, pt scheduled for induction this morning at 0530, pt reports some lof, amnisure done, negative result, fht reactive, oj every 4 minutes, palpate mild, cervix 3/80/-2, vertex, bp's reviewed, baby LGA weighing 6lb8oz at 37 wks, gbs-. New orders received.

## 2020-01-20 NOTE — LACTATION NOTE
Met with Pt in room. Gave breastfeeding information and discussed basics. Offered support with latching. Pt giving bottle of formula during my stop in room. Pt states baby fed well after deliver and now is not latching. Encouraged skin to skin and trying baby on breast frequently. Reviewed support available, pt voiced understanding.

## 2020-01-20 NOTE — ANESTHESIA PROCEDURE NOTES
Epidural Block    Patient location during procedure: OB  Reason for block: labor epidural  Staffing  Anesthesiologist: Krystina Moreno MD  Resident/CRNA: MEHREEN Chung - CRNA  Performed: resident/CRNA   Preanesthetic Checklist  Completed: patient identified, site marked, surgical consent, pre-op evaluation, timeout performed, IV checked, risks and benefits discussed, monitors and equipment checked, anesthesia consent given, oxygen available and patient being monitored  Epidural  Patient position: sitting  Prep: ChloraPrep  Approach: midline  Location: lumbar (1-5)  Injection technique: JUANITA air  Provider prep: mask and sterile gloves  Needle  Needle type: Tuohy   Needle gauge: 18 G  Needle length: 3.5 in  Needle insertion depth: 9 cm  Catheter type: side hole  Catheter at skin depth: 13 cm  Test dose: negative  Assessment  Hemodynamics: stable  Attempts: 1

## 2020-01-20 NOTE — ANESTHESIA PRE PROCEDURE
MD Dagoberto        diphenhydrAMINE (BENADRYL) injection 25 mg  25 mg Intravenous Q4H PRN Kwaku Frias MD        [facility]ron [facility] injection 8 mg  8 mg Intravenous Q6H PRN Kwaku Frias MD        terbutaline (BRETHINE) injection 0.25 mg  0.25 mg Subcutaneous Once Kwaku Frias MD        oxytocin (PITOCIN) 30 units in 500 mL infusion  1 amy-units/min Intravenous Continuous PRN Kwaku Frias MD        methylergonovine (METHERGINE) injection 200 mcg  200 mcg Intramuscular PRN Kwaku Frias MD        carboprost (HEMABATE) injection 250 mcg  250 mcg Intramuscular PRN Kwaku Frias MD        misoprostol (CYTOTEC) tablet 1,000 mcg  1,000 mcg Rectal PRN Kwaku Frias MD        witch hazel-glycerin (TUCKS) pad   Topical PRN Kwaku Frias MD        benzocaine-menthol (DERMOPLAST) 20-0.5 % spray   Topical PRN Kwaku Frias MD           Allergies:  No Known Allergies    Problem List:  There is no problem list on file for this patient. Past Medical History:  History reviewed. No pertinent past medical history. Past Surgical History:  History reviewed. No pertinent surgical history.     Social History:    Social History     Tobacco Use    Smoking status: Current Every Day Smoker     Packs/day: 0.50    Smokeless tobacco: Never Used   Substance Use Topics    Alcohol use: Not Currently                                Ready to quit: Not Answered  Counseling given: Not Answered      Vital Signs (Current):   Vitals:    01/20/20 0157 01/20/20 0212 01/20/20 0243 01/20/20 0313   BP: 125/67 116/64 (!) 146/83 (!) 143/90   Pulse: 67 59 81 73   Resp: 14 12 14 14   Temp:       TempSrc:       SpO2:       Weight:       Height:                                                  BP Readings from Last 3 Encounters:   01/20/20 (!) 143/90   07/08/19 114/65   06/29/19 118/62       NPO Status: Time of last liquid consumption: 0057 Time of last solid consumption: 2000                        Date of last liquid consumption: 01/20/20                        Date of last solid food consumption: 01/19/20    BMI:   Wt Readings from Last 3 Encounters:   01/20/20 250 lb (113.4 kg)   06/29/19 215 lb (97.5 kg)   03/30/19 218 lb (98.9 kg)     Body mass index is 32.98 kg/m². CBC:   Lab Results   Component Value Date    WBC 13.2 01/20/2020    RBC 4.22 01/20/2020    HGB 13.1 01/20/2020    HCT 38.6 01/20/2020    MCV 91.5 01/20/2020     01/20/2020       CMP:   Lab Results   Component Value Date     01/20/2020    K 3.9 01/20/2020     01/20/2020    CO2 19 01/20/2020    BUN 10 01/20/2020    CREATININE 0.5 01/20/2020    LABGLOM >90 01/20/2020    GLUCOSE 80 01/20/2020    PROT 5.9 01/20/2020    CALCIUM 9.1 01/20/2020    BILITOT 0.3 01/20/2020    ALKPHOS 269 01/20/2020    AST 14 01/20/2020    ALT 8 01/20/2020       POC Tests: No results for input(s): POCGLU, POCNA, POCK, POCCL, POCBUN, POCHEMO, POCHCT in the last 72 hours. Coags: No results found for: PROTIME, INR, APTT    HCG (If Applicable):   Lab Results   Component Value Date    PREGSERUM POSITIVE 06/29/2019        ABGs: No results found for: PHART, PO2ART, HZE1CZF, GPW7RUU, BEART, V1LGDCIS     Type & Screen (If Applicable):  Lab Results   Component Value Date    [address] POS 01/20/2020       Anesthesia Evaluation  Patient summary reviewed and Nursing notes reviewed no history of anesthetic complications:   Airway: Mallampati: II  TM distance: >3 FB   Neck ROM: full  Mouth opening: > = 3 FB Dental: normal exam         Pulmonary:   (+) current smoker          Patient smoked on day of surgery.                  Cardiovascular:  Exercise tolerance: good (>4 METS),                     Neuro/Psych:   Negative Neuro/Psych ROS              GI/Hepatic/Renal: Neg GI/Hepatic/Renal ROS            Endo/Other: Negative Endo/Other ROS                    Abdominal:           Vascular: negative vascular

## 2020-01-20 NOTE — FLOWSHEET NOTE
Dr. Danya Swan updated on  39&0 was scheduled for induction this morning, came in around 0040 last evening with contractions, SROM at 0222, meconium stained fluid, pt comfortable with epidural, cervix /+1, gbs-. Continue with poc.

## 2020-01-21 LAB — HEMOGLOBIN: 10.4 GM/DL (ref 12–16)

## 2020-01-21 PROCEDURE — 85018 HEMOGLOBIN: CPT

## 2020-01-21 PROCEDURE — 2709999900 HC NON-CHARGEABLE SUPPLY

## 2020-01-21 PROCEDURE — 1220000000 HC SEMI PRIVATE OB R&B

## 2020-01-21 PROCEDURE — 6370000000 HC RX 637 (ALT 250 FOR IP): Performed by: OBSTETRICS & GYNECOLOGY

## 2020-01-21 PROCEDURE — 36415 COLL VENOUS BLD VENIPUNCTURE: CPT

## 2020-01-21 RX ADMIN — DOCUSATE SODIUM 100 MG: 100 CAPSULE, LIQUID FILLED ORAL at 08:03

## 2020-01-21 RX ADMIN — HYDROCODONE BITARTRATE AND ACETAMINOPHEN 1 TABLET: 5; 325 TABLET ORAL at 09:49

## 2020-01-21 RX ADMIN — HYDROCODONE BITARTRATE AND ACETAMINOPHEN 1 TABLET: 5; 325 TABLET ORAL at 00:39

## 2020-01-21 RX ADMIN — HYDROCODONE BITARTRATE AND ACETAMINOPHEN 1 TABLET: 5; 325 TABLET ORAL at 13:53

## 2020-01-21 RX ADMIN — HYDROCODONE BITARTRATE AND ACETAMINOPHEN 1 TABLET: 5; 325 TABLET ORAL at 05:10

## 2020-01-21 RX ADMIN — IBUPROFEN 800 MG: 800 TABLET, FILM COATED ORAL at 18:01

## 2020-01-21 RX ADMIN — HYDROCODONE BITARTRATE AND ACETAMINOPHEN 1 TABLET: 5; 325 TABLET ORAL at 18:01

## 2020-01-21 RX ADMIN — HYDROCODONE BITARTRATE AND ACETAMINOPHEN 1 TABLET: 5; 325 TABLET ORAL at 22:54

## 2020-01-21 RX ADMIN — DOCUSATE SODIUM 100 MG: 100 CAPSULE, LIQUID FILLED ORAL at 20:47

## 2020-01-21 RX ADMIN — IBUPROFEN 800 MG: 800 TABLET, FILM COATED ORAL at 09:49

## 2020-01-21 ASSESSMENT — PAIN SCALES - GENERAL
PAINLEVEL_OUTOF10: 7
PAINLEVEL_OUTOF10: 4
PAINLEVEL_OUTOF10: 5
PAINLEVEL_OUTOF10: 7

## 2020-01-21 NOTE — PROGRESS NOTES
Department of Obstetrics and Gynecology  Labor and Delivery  Attending Post Partum Progress Note    PPD #1    SUBJECTIVE: Feeling well. No complaints. OBJECTIVE:     Vitals:  /83   Pulse 79   Temp 98.1 °F (36.7 °C) (Oral)   Resp 16   Ht 6' 1\" (1.854 m)   Wt 250 lb (113.4 kg)   LMP 03/26/2019   SpO2 98%   Breastfeeding Unknown   BMI 32.98 kg/m²     Uterus:  normal size, well involuted, firm, non-tender    DATA:    Hemoglobin:   Lab Results   Component Value Date    HGB 10.4 01/21/2020       ASSESSMENT & PLAN:  Doing well. Anticipate home on post partum day #2.     Shelbie Fournier 1/21/2020

## 2020-01-21 NOTE — ANESTHESIA POSTPROCEDURE EVALUATION
Department of Anesthesiology  Postprocedure Note    Patient: Rhoda Salinas  MRN: [id number]  YOB: 1995  Date of evaluation: 1/21/2020  Time:  7:35 AM     Procedure Summary     Date:  01/20/20 Room / Location:      Anesthesia Start:  0340 Anesthesia Stop:  1077    Procedure:  Labor Analgesia Diagnosis:      Scheduled Providers:   Responsible Provider:  Vivek Orona MD    Anesthesia Type:  epidural ASA Status:  2          Anesthesia Type: epidural    Stpehen Phase I: Stephen Score: 10    Stephen Phase II: Stephen Score: 10    Last vitals: Reviewed and per EMR flowsheets.        Anesthesia Post Evaluation    Patient location during evaluation: bedside  Patient participation: complete - patient participated  Level of consciousness: awake and alert  Airway patency: patent  Nausea & Vomiting: no nausea and no vomiting  Complications: no  Cardiovascular status: hemodynamically stable  Respiratory status: spontaneous ventilation, room air and nonlabored ventilation  Hydration status: stable

## 2020-01-22 VITALS
HEIGHT: 72 IN | RESPIRATION RATE: 16 BRPM | SYSTOLIC BLOOD PRESSURE: 128 MMHG | HEART RATE: 67 BPM | WEIGHT: 250 LBS | TEMPERATURE: 96.5 F | BODY MASS INDEX: 33.86 KG/M2 | DIASTOLIC BLOOD PRESSURE: 85 MMHG | OXYGEN SATURATION: 98 %

## 2020-01-22 PROCEDURE — 6370000000 HC RX 637 (ALT 250 FOR IP): Performed by: OBSTETRICS & GYNECOLOGY

## 2020-01-22 PROCEDURE — 2709999900 HC NON-CHARGEABLE SUPPLY

## 2020-01-22 RX ADMIN — IBUPROFEN 800 MG: 800 TABLET, FILM COATED ORAL at 03:33

## 2020-01-22 RX ADMIN — HYDROCODONE BITARTRATE AND ACETAMINOPHEN 1 TABLET: 5; 325 TABLET ORAL at 08:37

## 2020-01-22 RX ADMIN — HYDROCODONE BITARTRATE AND ACETAMINOPHEN 1 TABLET: 5; 325 TABLET ORAL at 03:33

## 2020-01-22 RX ADMIN — BENZOCAINE AND LEVOMENTHOL: 200; 5 SPRAY TOPICAL at 12:20

## 2020-01-22 RX ADMIN — DOCUSATE SODIUM 100 MG: 100 CAPSULE, LIQUID FILLED ORAL at 08:37

## 2020-01-22 ASSESSMENT — PAIN SCALES - GENERAL
PAINLEVEL_OUTOF10: 6

## 2020-01-22 NOTE — FLOWSHEET NOTE
Postpartum education brochure given, teaching complete. [place] postpartum depression screening discussed with patient. Patient instructed to complete [facility] postpartum depression screening in 2 weeks and contact her healthcare provider if her score is > 10. Patient voiced understanding. Reviewed postpartum birth warning signs flyer with patient. Patient has voiced understanding of teaching. Discharge teaching and instructions for diagnosis/procedure of vaginal delivery completed with patient using teachback method. AVS reviewed. Patient voiced understanding regarding  follow up appointments, and care of self at home. Pt instructed on perineal meds. Pt verbalized understanding and may self medicate. Mother's blood type is A+.  Pt refused offer of TDap, Flu and Pneumonia Vaccine

## 2020-01-22 NOTE — DISCHARGE SUMMARY
Vaginal Delivery Discharge Summary    Gestational Age:39w0d    Antepartum complications: none    Admission date: 2020 12:40 AM      Type of Delivery:      [place] Data  Information for the patient's :  Neo Carey [[id number]]   female  Birth Weight: 8 lb 6.8 oz (3.82 kg)      Labs: CBC   Lab Results   Component Value Date    HGB 10.4 (L) 2020    HCT 38.6 2020        Intrapartum complications: None    Postpartum complications: none    The patient is ambulating well. The patient is tolerating a normal diet. Patient Instructions: Activity: activity as tolerated and no sex for 6 weeks  Diet: regular  Wound Care: as directed    Discharge Information  Current Discharge Medication List      CONTINUE these medications which have NOT CHANGED    Details   Prenatal Vit-Fe Fumarate-FA (PRENATAL 1+1 PO) Take by mouth      docusate sodium (COLACE) 100 MG capsule Take 100 mg by mouth 2 times daily      vitamin B-6 (PYRIDOXINE) 50 MG tablet Take 50 mg by mouth 2 times daily      folic acid (FOLVITE) 1 MG tablet Take 1 mg by mouth daily      metoclopramide (REGLAN) 10 MG tablet Take 10 mg by mouth 4 times daily             No discharge procedures on file.     Condition: Good    Plan:   Follow up in 5 week(s)    Electronically signed by Lucila Forman MD on 2020 at 8:39 AM